# Patient Record
Sex: MALE | Race: OTHER
[De-identification: names, ages, dates, MRNs, and addresses within clinical notes are randomized per-mention and may not be internally consistent; named-entity substitution may affect disease eponyms.]

---

## 2018-11-22 ENCOUNTER — HOSPITAL ENCOUNTER (EMERGENCY)
Dept: HOSPITAL 72 - EMR | Age: 53
Discharge: NURSING, REHAB OR LONG-TERM CARE | End: 2018-11-22
Payer: COMMERCIAL

## 2018-11-22 VITALS — BODY MASS INDEX: 19.7 KG/M2 | HEIGHT: 68 IN | WEIGHT: 130 LBS

## 2018-11-22 VITALS — DIASTOLIC BLOOD PRESSURE: 66 MMHG | SYSTOLIC BLOOD PRESSURE: 99 MMHG

## 2018-11-22 DIAGNOSIS — I10: ICD-10-CM

## 2018-11-22 DIAGNOSIS — J44.9: ICD-10-CM

## 2018-11-22 DIAGNOSIS — F32.9: ICD-10-CM

## 2018-11-22 DIAGNOSIS — F41.9: ICD-10-CM

## 2018-11-22 DIAGNOSIS — F14.10: Primary | ICD-10-CM

## 2018-11-22 PROCEDURE — 99282 EMERGENCY DEPT VISIT SF MDM: CPT

## 2018-11-22 NOTE — EMERGENCY ROOM REPORT
History of Present Illness


General


Chief Complaint:  General Complaint


Source:  Patient





Present Illness


HPI


Is a 53-year-old male coming from the nursing home with chief complaint of 

abnormal labs.  He was there for rehabilitation from unspecified sepsis.  A drug

-seeking was done and was positive for cocaine and opiates.  He was sent here 

for evaluation.  Initially he complaining of shortness of breath.  Here he has 

no complaint.  No chest pain.  No short of breath.  No other complaint.


Allergies:  


Coded Allergies:  


     No Known Allergies (Unverified , 11/22/18)





Patient History


Past Medical History:  see triage record, old chart reviewed


Past Surgical History:  other


Pertinent Family History:  none


Social History:  Denies: smoking


Immunizations:  other


Reviewed Nursing Documentation:  PMH: Agreed; PSxH: Agreed





Nursing Documentation-PMH


Past Medical History:  No History, Except For


Hx Cardiac Problems:  No - anemia, hypokalemia


Hx Hypertension:  Yes


Hx COPD:  Yes


Hx Neurological Problems:  Yes - depression, anxiety





Review of Systems


Eye:  Denies: eye pain, blurred vision


ENT:  Denies: ear pain, nose congestion, throat swelling


Respiratory:  Denies: cough, shortness of breath


Cardiovascular:  Denies: chest pain, palpitations


Gastrointestinal:  Denies: abdominal pain, diarrhea, nausea, vomiting


Musculoskeletal:  Denies: back pain, joint pain


Skin:  Denies: rash


Neurological:  Denies: headache, numbness


Endocrine:  Denies: increased thirst, increased urine


Hematologic/Lymphatic:  Denies: easy bruising


All Other Systems:  negative except mentioned in HPI





Physical Exam





Vital Signs








  Date Time  Temp Pulse Resp B/P (MAP) Pulse Ox O2 Delivery O2 Flow Rate FiO2


 


11/22/18 22:23 98.8 99 18 99/66 92 Room Air  





vitals normal. Repeat pulse ox 98%


Sp02 EP Interpretation:  reviewed, normal


General Appearance:  well appearing, no apparent distress, alert


Head:  normocephalic, atraumatic


Eyes:  bilateral eye PERRL, bilateral eye EOMI


ENT:  hearing grossly normal, normal pharynx


Neck:  full range of motion, supple, no meningismus


Respiratory:  chest non-tender, lungs clear, normal breath sounds


Cardiovascular #1:  regular rate, rhythm, no murmur


Gastrointestinal:  normal bowel sounds, non tender, no mass, no organomegaly, 

no bruit, non-distended


Musculoskeletal:  back normal, normal range of motion


Neurologic:  alert, oriented x3


Psychiatric:  mood/affect normal


Skin:  warm/dry





Medical Decision Making


Diagnostic Impression:  


 Primary Impression:  


 Cocaine abuse


ER Course


Is in with cocaine abuse.  Uncomplicated.  He claimed that he was using it for 

dental pain.  He has no rest or distress.  He has no chest pain.  I see no need 

for any further workup.  EKG unremarkable.


EKG Diagnostic Results


Rate:  normal


Rhythm:  NSR


ST Segments:  no acute changes





Last Vital Signs








  Date Time  Temp Pulse Resp B/P (MAP) Pulse Ox O2 Delivery O2 Flow Rate FiO2


 


11/22/18 22:30  99 18   Room Air  


 


11/22/18 22:23 98.8   99/66 92   








Status:  unchanged


Disposition:  [facility]


Condition:  Stable


Referrals:  


Sang Holt DO (PCP)





Additional Instructions:  


stop using drugs.  Follow up with your doctor in 7 days. Return if worse.











Duane Hernandez MD Nov 22, 2018 22:44